# Patient Record
Sex: MALE | Race: OTHER | HISPANIC OR LATINO | URBAN - METROPOLITAN AREA
[De-identification: names, ages, dates, MRNs, and addresses within clinical notes are randomized per-mention and may not be internally consistent; named-entity substitution may affect disease eponyms.]

---

## 2017-06-01 ENCOUNTER — INPATIENT (INPATIENT)
Facility: HOSPITAL | Age: 52
LOS: 1 days | Discharge: HOME | DRG: 392 | End: 2017-06-03
Attending: SURGERY | Admitting: SURGERY
Payer: MEDICAID

## 2017-06-01 VITALS
RESPIRATION RATE: 17 BRPM | SYSTOLIC BLOOD PRESSURE: 136 MMHG | OXYGEN SATURATION: 98 % | TEMPERATURE: 98 F | DIASTOLIC BLOOD PRESSURE: 85 MMHG | HEART RATE: 95 BPM

## 2017-06-01 DIAGNOSIS — Z90.49 ACQUIRED ABSENCE OF OTHER SPECIFIED PARTS OF DIGESTIVE TRACT: Chronic | ICD-10-CM

## 2017-06-01 DIAGNOSIS — K57.40 DIVERTICULITIS OF BOTH SMALL AND LARGE INTESTINE WITH PERFORATION AND ABSCESS WITHOUT BLEEDING: ICD-10-CM

## 2017-06-01 LAB
ALBUMIN SERPL ELPH-MCNC: 4.2 G/DL — SIGNIFICANT CHANGE UP (ref 3.3–5)
ALP SERPL-CCNC: 107 U/L — SIGNIFICANT CHANGE UP (ref 40–120)
ALT FLD-CCNC: 19 U/L RC — SIGNIFICANT CHANGE UP (ref 10–45)
ANION GAP SERPL CALC-SCNC: 12 MMOL/L — SIGNIFICANT CHANGE UP (ref 5–17)
APPEARANCE UR: ABNORMAL
AST SERPL-CCNC: 20 U/L — SIGNIFICANT CHANGE UP (ref 10–40)
BACTERIA # UR AUTO: ABNORMAL /HPF
BASOPHILS # BLD AUTO: 0 K/UL — SIGNIFICANT CHANGE UP (ref 0–0.2)
BASOPHILS NFR BLD AUTO: 0.2 % — SIGNIFICANT CHANGE UP (ref 0–2)
BILIRUB SERPL-MCNC: 1 MG/DL — SIGNIFICANT CHANGE UP (ref 0.2–1.2)
BILIRUB UR-MCNC: NEGATIVE — SIGNIFICANT CHANGE UP
BUN SERPL-MCNC: 15 MG/DL — SIGNIFICANT CHANGE UP (ref 7–23)
CALCIUM SERPL-MCNC: 9.6 MG/DL — SIGNIFICANT CHANGE UP (ref 8.4–10.5)
CHLORIDE SERPL-SCNC: 103 MMOL/L — SIGNIFICANT CHANGE UP (ref 96–108)
CO2 SERPL-SCNC: 28 MMOL/L — SIGNIFICANT CHANGE UP (ref 22–31)
COLOR SPEC: YELLOW — SIGNIFICANT CHANGE UP
CREAT SERPL-MCNC: 0.97 MG/DL — SIGNIFICANT CHANGE UP (ref 0.5–1.3)
DIFF PNL FLD: NEGATIVE — SIGNIFICANT CHANGE UP
EOSINOPHIL # BLD AUTO: 0.1 K/UL — SIGNIFICANT CHANGE UP (ref 0–0.5)
EOSINOPHIL NFR BLD AUTO: 0.5 % — SIGNIFICANT CHANGE UP (ref 0–6)
EPI CELLS # UR: SIGNIFICANT CHANGE UP /HPF
GAS PNL BLDV: SIGNIFICANT CHANGE UP
GLUCOSE SERPL-MCNC: 104 MG/DL — HIGH (ref 70–99)
GLUCOSE UR QL: NEGATIVE — SIGNIFICANT CHANGE UP
HCT VFR BLD CALC: 47.4 % — SIGNIFICANT CHANGE UP (ref 39–50)
HGB BLD-MCNC: 15.5 G/DL — SIGNIFICANT CHANGE UP (ref 13–17)
KETONES UR-MCNC: NEGATIVE — SIGNIFICANT CHANGE UP
LEUKOCYTE ESTERASE UR-ACNC: ABNORMAL
LYMPHOCYTES # BLD AUTO: 1.7 K/UL — SIGNIFICANT CHANGE UP (ref 1–3.3)
LYMPHOCYTES # BLD AUTO: 9.7 % — LOW (ref 13–44)
MCHC RBC-ENTMCNC: 28.8 PG — SIGNIFICANT CHANGE UP (ref 27–34)
MCHC RBC-ENTMCNC: 32.8 GM/DL — SIGNIFICANT CHANGE UP (ref 32–36)
MCV RBC AUTO: 87.9 FL — SIGNIFICANT CHANGE UP (ref 80–100)
MONOCYTES # BLD AUTO: 1.6 K/UL — HIGH (ref 0–0.9)
MONOCYTES NFR BLD AUTO: 9 % — SIGNIFICANT CHANGE UP (ref 2–14)
NEUTROPHILS # BLD AUTO: 13.9 K/UL — HIGH (ref 1.8–7.4)
NEUTROPHILS NFR BLD AUTO: 80.6 % — HIGH (ref 43–77)
NITRITE UR-MCNC: NEGATIVE — SIGNIFICANT CHANGE UP
PH UR: 6.5 — SIGNIFICANT CHANGE UP (ref 5–8)
PLATELET # BLD AUTO: 210 K/UL — SIGNIFICANT CHANGE UP (ref 150–400)
POTASSIUM SERPL-MCNC: 4 MMOL/L — SIGNIFICANT CHANGE UP (ref 3.5–5.3)
POTASSIUM SERPL-SCNC: 4 MMOL/L — SIGNIFICANT CHANGE UP (ref 3.5–5.3)
PROT SERPL-MCNC: 7.3 G/DL — SIGNIFICANT CHANGE UP (ref 6–8.3)
PROT UR-MCNC: SIGNIFICANT CHANGE UP
RBC # BLD: 5.4 M/UL — SIGNIFICANT CHANGE UP (ref 4.2–5.8)
RBC # FLD: 12.6 % — SIGNIFICANT CHANGE UP (ref 10.3–14.5)
RBC CASTS # UR COMP ASSIST: SIGNIFICANT CHANGE UP /HPF (ref 0–2)
SODIUM SERPL-SCNC: 143 MMOL/L — SIGNIFICANT CHANGE UP (ref 135–145)
SP GR SPEC: 1.01 — SIGNIFICANT CHANGE UP (ref 1.01–1.02)
UROBILINOGEN FLD QL: NEGATIVE — SIGNIFICANT CHANGE UP
WBC # BLD: 17.7 K/UL — HIGH (ref 3.8–10.5)
WBC # FLD AUTO: 17.7 K/UL — HIGH (ref 3.8–10.5)
WBC UR QL: SIGNIFICANT CHANGE UP /HPF (ref 0–5)

## 2017-06-01 PROCEDURE — 99222 1ST HOSP IP/OBS MODERATE 55: CPT

## 2017-06-01 PROCEDURE — 99285 EMERGENCY DEPT VISIT HI MDM: CPT | Mod: 25

## 2017-06-01 PROCEDURE — 74177 CT ABD & PELVIS W/CONTRAST: CPT | Mod: 26

## 2017-06-01 RX ORDER — PIPERACILLIN AND TAZOBACTAM 4; .5 G/20ML; G/20ML
3.38 INJECTION, POWDER, LYOPHILIZED, FOR SOLUTION INTRAVENOUS EVERY 8 HOURS
Qty: 0 | Refills: 0 | Status: DISCONTINUED | OUTPATIENT
Start: 2017-06-01 | End: 2017-06-03

## 2017-06-01 RX ORDER — HYDROMORPHONE HYDROCHLORIDE 2 MG/ML
0.5 INJECTION INTRAMUSCULAR; INTRAVENOUS; SUBCUTANEOUS EVERY 4 HOURS
Qty: 0 | Refills: 0 | Status: DISCONTINUED | OUTPATIENT
Start: 2017-06-01 | End: 2017-06-03

## 2017-06-01 RX ORDER — ENOXAPARIN SODIUM 100 MG/ML
40 INJECTION SUBCUTANEOUS DAILY
Qty: 0 | Refills: 0 | Status: DISCONTINUED | OUTPATIENT
Start: 2017-06-01 | End: 2017-06-03

## 2017-06-01 RX ORDER — PIPERACILLIN AND TAZOBACTAM 4; .5 G/20ML; G/20ML
3.38 INJECTION, POWDER, LYOPHILIZED, FOR SOLUTION INTRAVENOUS ONCE
Qty: 0 | Refills: 0 | Status: COMPLETED | OUTPATIENT
Start: 2017-06-01 | End: 2017-06-01

## 2017-06-01 RX ORDER — DEXTROSE MONOHYDRATE, SODIUM CHLORIDE, AND POTASSIUM CHLORIDE 50; .745; 4.5 G/1000ML; G/1000ML; G/1000ML
1000 INJECTION, SOLUTION INTRAVENOUS
Qty: 0 | Refills: 0 | Status: DISCONTINUED | OUTPATIENT
Start: 2017-06-01 | End: 2017-06-03

## 2017-06-01 RX ORDER — SODIUM CHLORIDE 9 MG/ML
1000 INJECTION INTRAMUSCULAR; INTRAVENOUS; SUBCUTANEOUS ONCE
Qty: 0 | Refills: 0 | Status: COMPLETED | OUTPATIENT
Start: 2017-06-01 | End: 2017-06-01

## 2017-06-01 RX ORDER — KETOROLAC TROMETHAMINE 30 MG/ML
15 SYRINGE (ML) INJECTION ONCE
Qty: 0 | Refills: 0 | Status: DISCONTINUED | OUTPATIENT
Start: 2017-06-01 | End: 2017-06-01

## 2017-06-01 RX ORDER — SODIUM CHLORIDE 9 MG/ML
1000 INJECTION, SOLUTION INTRAVENOUS
Qty: 0 | Refills: 0 | Status: DISCONTINUED | OUTPATIENT
Start: 2017-06-01 | End: 2017-06-01

## 2017-06-01 RX ADMIN — Medication 15 MILLIGRAM(S): at 10:54

## 2017-06-01 RX ADMIN — Medication 15 MILLIGRAM(S): at 08:43

## 2017-06-01 RX ADMIN — SODIUM CHLORIDE 2000 MILLILITER(S): 9 INJECTION INTRAMUSCULAR; INTRAVENOUS; SUBCUTANEOUS at 08:43

## 2017-06-01 RX ADMIN — PIPERACILLIN AND TAZOBACTAM 25 GRAM(S): 4; .5 INJECTION, POWDER, LYOPHILIZED, FOR SOLUTION INTRAVENOUS at 21:32

## 2017-06-01 RX ADMIN — PIPERACILLIN AND TAZOBACTAM 25 GRAM(S): 4; .5 INJECTION, POWDER, LYOPHILIZED, FOR SOLUTION INTRAVENOUS at 21:31

## 2017-06-01 RX ADMIN — PIPERACILLIN AND TAZOBACTAM 200 GRAM(S): 4; .5 INJECTION, POWDER, LYOPHILIZED, FOR SOLUTION INTRAVENOUS at 10:54

## 2017-06-01 RX ADMIN — SODIUM CHLORIDE 125 MILLILITER(S): 9 INJECTION, SOLUTION INTRAVENOUS at 10:52

## 2017-06-01 RX ADMIN — DEXTROSE MONOHYDRATE, SODIUM CHLORIDE, AND POTASSIUM CHLORIDE 75 MILLILITER(S): 50; .745; 4.5 INJECTION, SOLUTION INTRAVENOUS at 21:36

## 2017-06-01 NOTE — ED PROVIDER NOTE - PHYSICAL EXAMINATION
Attending MD Benavides: A & O x 3, NAD, HEENT WNL and no facial asymmetry; lungs CTAB, heart with reg rhythm without murmur; abdomen soft, moderate distention, +focal ttp in LLQ with voluntary guarding; extremities with no edema; neuro exam non focal with no motor or sensory deficits.

## 2017-06-01 NOTE — ED PROVIDER NOTE - ATTENDING CONTRIBUTION TO CARE
Attending MD Benavides:  I personally have seen and examined this patient.  Resident note reviewed and agree on plan of care and except where noted.  See HPI, PE, and MDM for details.     51M with LLQ abd pain, exam with focal LLQ abd pain, likely diverticulitis, will obtain CT a/p to confirm and rule out complicated diverticulitis

## 2017-06-01 NOTE — ED PROVIDER NOTE - CARE PLAN
Principal Discharge DX:	Diverticulitis of both small and large intestine with perforation or abscess without bleeding

## 2017-06-01 NOTE — ED PROVIDER NOTE - MEDICAL DECISION MAKING DETAILS
51M with signs and symptoms concerning for diverticulitis. No prior colonoscopy. No prior pains. Will obtain ctap and blood to r/o complications. Yossi Tomlinson, Resident.

## 2017-06-01 NOTE — ED PROVIDER NOTE - OBJECTIVE STATEMENT
51M presents with LLQ pain that started suddenly yesterday, moderate, radiating to the RLQ and back, constant worse with movement/coughing and better with rest. No change in BM. Denies fevers/chills, nausea/vomiting, headache, chest pain, shortness of breath, bowel/bladder changes.

## 2017-06-01 NOTE — H&P ADULT. - ASSESSMENT
51M Diverticulitis with surrounding inflammation  - Admit to [facility], Dr. Reyes  - NPO/IVF  - IV Zosyn  - Serial abdominal exams  - OOB  - Lovenox DVT ppx  - Pain control PRN

## 2017-06-01 NOTE — ED ADULT NURSE REASSESSMENT NOTE - NS ED NURSE REASSESS COMMENT FT1
1106  Pt reassessed . Pt Discussed with pt his radiology results . Due meds given pain under control denies N/V/D  pt kept NPO awaiting for decision

## 2017-06-01 NOTE — H&P ADULT. - HISTORY OF PRESENT ILLNESS
51M presents with one day of abdominal pain and chills at home. Denies n/v, denies change in bowel habits. Has never had a colonoscopy. Has never had diverticulitis previously.   Patient's past medical/surgical history significant for cholecystectomy >10y ago. He does not take any medications. He is a current smoker.  On exam the patient was afebrile with stable vital signs. He appeared comfortable and in no acute distress. His abdomen was soft with minimal LLQ tenderness, no rebound or guarding.  Laboratory values showed leukocytosis to 17, venous lactate 1.2. BMP wnl.   CT scan revealed diverticulitis of distal descending and proximal sigmoid colon with inflammatory changes consistent with local perforation.

## 2017-06-01 NOTE — ED ADULT NURSE REASSESSMENT NOTE - NS ED NURSE REASSESS COMMENT FT1
0950 pt reassessed. Pt feels better with the pain after pain meds. Pian scale 4/10.  Pt waiting for CT scan.  Pt has no N/V/D.

## 2017-06-01 NOTE — ED ADULT NURSE NOTE - CHPI ED SYMPTOMS NEG
no burning urination/no dysuria/no nausea/no hematuria/no vomiting/no fever/no blood in stool/no diarrhea/no abdominal distension

## 2017-06-01 NOTE — ED ADULT NURSE NOTE - OBJECTIVE STATEMENT
Pt is C/O LLQ pain x 1 day  pt woke up with the pain . pain  more on LLQ Pt denies  trauma heavy lifting  or injury. Pt states pain radiating to the back , Pt denies N/V/D constipation CP/SOB  last BM  today am constipated & painful  at strain . Cindy changes in urine strain colour or odor  C/O chills yesterday afebrile here  pt is elaine getting evaluated by Ed team

## 2017-06-02 LAB
ANION GAP SERPL CALC-SCNC: 11 MMOL/L — SIGNIFICANT CHANGE UP (ref 5–17)
BUN SERPL-MCNC: 10 MG/DL — SIGNIFICANT CHANGE UP (ref 7–23)
CALCIUM SERPL-MCNC: 8.4 MG/DL — SIGNIFICANT CHANGE UP (ref 8.4–10.5)
CHLORIDE SERPL-SCNC: 105 MMOL/L — SIGNIFICANT CHANGE UP (ref 96–108)
CO2 SERPL-SCNC: 25 MMOL/L — SIGNIFICANT CHANGE UP (ref 22–31)
CREAT SERPL-MCNC: 1.02 MG/DL — SIGNIFICANT CHANGE UP (ref 0.5–1.3)
GLUCOSE SERPL-MCNC: 95 MG/DL — SIGNIFICANT CHANGE UP (ref 70–99)
HCT VFR BLD CALC: 41.7 % — SIGNIFICANT CHANGE UP (ref 39–50)
HGB BLD-MCNC: 13.9 G/DL — SIGNIFICANT CHANGE UP (ref 13–17)
MAGNESIUM SERPL-MCNC: 2.3 MG/DL — SIGNIFICANT CHANGE UP (ref 1.6–2.6)
MCHC RBC-ENTMCNC: 28.5 PG — SIGNIFICANT CHANGE UP (ref 27–34)
MCHC RBC-ENTMCNC: 33.3 GM/DL — SIGNIFICANT CHANGE UP (ref 32–36)
MCV RBC AUTO: 85.5 FL — SIGNIFICANT CHANGE UP (ref 80–100)
PHOSPHATE SERPL-MCNC: 3 MG/DL — SIGNIFICANT CHANGE UP (ref 2.5–4.5)
PLATELET # BLD AUTO: 222 K/UL — SIGNIFICANT CHANGE UP (ref 150–400)
POTASSIUM SERPL-MCNC: 4.5 MMOL/L — SIGNIFICANT CHANGE UP (ref 3.5–5.3)
POTASSIUM SERPL-SCNC: 4.5 MMOL/L — SIGNIFICANT CHANGE UP (ref 3.5–5.3)
RBC # BLD: 4.88 M/UL — SIGNIFICANT CHANGE UP (ref 4.2–5.8)
RBC # FLD: 14.2 % — SIGNIFICANT CHANGE UP (ref 10.3–14.5)
SODIUM SERPL-SCNC: 141 MMOL/L — SIGNIFICANT CHANGE UP (ref 135–145)
WBC # BLD: 13.45 K/UL — HIGH (ref 3.8–10.5)
WBC # FLD AUTO: 13.45 K/UL — HIGH (ref 3.8–10.5)

## 2017-06-02 PROCEDURE — 99231 SBSQ HOSP IP/OBS SF/LOW 25: CPT

## 2017-06-02 RX ADMIN — PIPERACILLIN AND TAZOBACTAM 25 GRAM(S): 4; .5 INJECTION, POWDER, LYOPHILIZED, FOR SOLUTION INTRAVENOUS at 13:09

## 2017-06-02 RX ADMIN — DEXTROSE MONOHYDRATE, SODIUM CHLORIDE, AND POTASSIUM CHLORIDE 75 MILLILITER(S): 50; .745; 4.5 INJECTION, SOLUTION INTRAVENOUS at 06:30

## 2017-06-02 RX ADMIN — PIPERACILLIN AND TAZOBACTAM 25 GRAM(S): 4; .5 INJECTION, POWDER, LYOPHILIZED, FOR SOLUTION INTRAVENOUS at 22:43

## 2017-06-02 RX ADMIN — PIPERACILLIN AND TAZOBACTAM 25 GRAM(S): 4; .5 INJECTION, POWDER, LYOPHILIZED, FOR SOLUTION INTRAVENOUS at 06:29

## 2017-06-02 RX ADMIN — ENOXAPARIN SODIUM 40 MILLIGRAM(S): 100 INJECTION SUBCUTANEOUS at 13:10

## 2017-06-03 VITALS
SYSTOLIC BLOOD PRESSURE: 120 MMHG | DIASTOLIC BLOOD PRESSURE: 77 MMHG | OXYGEN SATURATION: 96 % | RESPIRATION RATE: 18 BRPM | HEART RATE: 70 BPM | TEMPERATURE: 98 F

## 2017-06-03 LAB
ANION GAP SERPL CALC-SCNC: 11 MMOL/L — SIGNIFICANT CHANGE UP (ref 5–17)
BUN SERPL-MCNC: 5 MG/DL — LOW (ref 7–23)
CALCIUM SERPL-MCNC: 9 MG/DL — SIGNIFICANT CHANGE UP (ref 8.4–10.5)
CHLORIDE SERPL-SCNC: 102 MMOL/L — SIGNIFICANT CHANGE UP (ref 96–108)
CO2 SERPL-SCNC: 24 MMOL/L — SIGNIFICANT CHANGE UP (ref 22–31)
CREAT SERPL-MCNC: 1.03 MG/DL — SIGNIFICANT CHANGE UP (ref 0.5–1.3)
GLUCOSE SERPL-MCNC: 105 MG/DL — HIGH (ref 70–99)
HCT VFR BLD CALC: 42.6 % — SIGNIFICANT CHANGE UP (ref 39–50)
HGB BLD-MCNC: 14.1 G/DL — SIGNIFICANT CHANGE UP (ref 13–17)
MAGNESIUM SERPL-MCNC: 2.3 MG/DL — SIGNIFICANT CHANGE UP (ref 1.6–2.6)
MCHC RBC-ENTMCNC: 28.4 PG — SIGNIFICANT CHANGE UP (ref 27–34)
MCHC RBC-ENTMCNC: 33.1 GM/DL — SIGNIFICANT CHANGE UP (ref 32–36)
MCV RBC AUTO: 85.9 FL — SIGNIFICANT CHANGE UP (ref 80–100)
PHOSPHATE SERPL-MCNC: 3.6 MG/DL — SIGNIFICANT CHANGE UP (ref 2.5–4.5)
PLATELET # BLD AUTO: 233 K/UL — SIGNIFICANT CHANGE UP (ref 150–400)
POTASSIUM SERPL-MCNC: 4.2 MMOL/L — SIGNIFICANT CHANGE UP (ref 3.5–5.3)
POTASSIUM SERPL-SCNC: 4.2 MMOL/L — SIGNIFICANT CHANGE UP (ref 3.5–5.3)
RBC # BLD: 4.96 M/UL — SIGNIFICANT CHANGE UP (ref 4.2–5.8)
RBC # FLD: 14.2 % — SIGNIFICANT CHANGE UP (ref 10.3–14.5)
SODIUM SERPL-SCNC: 137 MMOL/L — SIGNIFICANT CHANGE UP (ref 135–145)
WBC # BLD: 10.08 K/UL — SIGNIFICANT CHANGE UP (ref 3.8–10.5)
WBC # FLD AUTO: 10.08 K/UL — SIGNIFICANT CHANGE UP (ref 3.8–10.5)

## 2017-06-03 PROCEDURE — 80048 BASIC METABOLIC PNL TOTAL CA: CPT

## 2017-06-03 PROCEDURE — 82435 ASSAY OF BLOOD CHLORIDE: CPT

## 2017-06-03 PROCEDURE — 84132 ASSAY OF SERUM POTASSIUM: CPT

## 2017-06-03 PROCEDURE — 83605 ASSAY OF LACTIC ACID: CPT

## 2017-06-03 PROCEDURE — 82803 BLOOD GASES ANY COMBINATION: CPT

## 2017-06-03 PROCEDURE — 84100 ASSAY OF PHOSPHORUS: CPT

## 2017-06-03 PROCEDURE — 82330 ASSAY OF CALCIUM: CPT

## 2017-06-03 PROCEDURE — 84295 ASSAY OF SERUM SODIUM: CPT

## 2017-06-03 PROCEDURE — 96374 THER/PROPH/DIAG INJ IV PUSH: CPT | Mod: XU

## 2017-06-03 PROCEDURE — 96375 TX/PRO/DX INJ NEW DRUG ADDON: CPT

## 2017-06-03 PROCEDURE — 99231 SBSQ HOSP IP/OBS SF/LOW 25: CPT

## 2017-06-03 PROCEDURE — 80053 COMPREHEN METABOLIC PANEL: CPT

## 2017-06-03 PROCEDURE — 83735 ASSAY OF MAGNESIUM: CPT

## 2017-06-03 PROCEDURE — 99285 EMERGENCY DEPT VISIT HI MDM: CPT | Mod: 25

## 2017-06-03 PROCEDURE — 82947 ASSAY GLUCOSE BLOOD QUANT: CPT

## 2017-06-03 PROCEDURE — 74177 CT ABD & PELVIS W/CONTRAST: CPT

## 2017-06-03 PROCEDURE — 85027 COMPLETE CBC AUTOMATED: CPT

## 2017-06-03 PROCEDURE — 81001 URINALYSIS AUTO W/SCOPE: CPT

## 2017-06-03 PROCEDURE — 85014 HEMATOCRIT: CPT

## 2017-06-03 RX ADMIN — PIPERACILLIN AND TAZOBACTAM 25 GRAM(S): 4; .5 INJECTION, POWDER, LYOPHILIZED, FOR SOLUTION INTRAVENOUS at 13:55

## 2017-06-03 RX ADMIN — ENOXAPARIN SODIUM 40 MILLIGRAM(S): 100 INJECTION SUBCUTANEOUS at 13:55

## 2017-06-03 RX ADMIN — PIPERACILLIN AND TAZOBACTAM 25 GRAM(S): 4; .5 INJECTION, POWDER, LYOPHILIZED, FOR SOLUTION INTRAVENOUS at 06:02

## 2017-06-03 NOTE — DISCHARGE NOTE ADULT - CARE PROVIDER_API CALL
Pérez Clay), Surgery  [address] [place] [id number]  Phone: [phone]  Fax: [phone]

## 2017-06-03 NOTE — DIETITIAN INITIAL EVALUATION ADULT. - ENERGY NEEDS
ht: 68 inches wt: 195.1 pounds (current as of 6/1). BMI: 29.7 kG/m2. UBW: 195 pounds. IBW: 154 pounds +/- 10%. %IBW: 127%  Other pertinent objective information: 51 year old male pt c no significant past medical hx presented c abd pain and chills, CT scan revealed diverticulitis of distal descending and proximal sigmoid colon c inflammatory changes consistent with local perforation. On antibiotics; no surgical treatment at this time per MD. No edema, no pressure injuries.

## 2017-06-03 NOTE — DIETITIAN INITIAL EVALUATION ADULT. - NS AS NUTRI INTERV ED CONTENT
1) Educated pt on low residue diet recommendations, including foods to limit/include in diet; discussed gradual inclusion of fiber into the diet as tolerated/as symptoms resolve to promote bowel integrity. Low-fiber nutrition therapy handout provided to pt for review at his leisure./Nutrition relationship to health/disease/Purpose of the nutrition education/Recommended modifications

## 2017-06-03 NOTE — DISCHARGE NOTE ADULT - PATIENT PORTAL LINK FT
“You can access the FollowHealth Patient Portal, offered by [facility], by registering with the following website: [email or web address]”

## 2017-06-03 NOTE — DISCHARGE NOTE ADULT - HOSPITAL COURSE
51M presents with one day of abdominal pain and chills at home. Denies n/v, denies change in bowel habits. Has never had a colonoscopy. Has never had diverticulitis previously.   Patient's past medical/surgical history significant for cholecystectomy >10y ago. He does not take any medications. He is a current smoker.  On exam the patient was afebrile with stable vital signs. He appeared comfortable and in no acute distress. His abdomen was soft with minimal LLQ tenderness, no rebound or guarding.  Laboratory values showed leukocytosis to 17, venous lactate 1.2. BMP wnl.   CT scan revealed diverticulitis of distal descending and proximal sigmoid colon with inflammatory changes consistent with local perforation.  Pt was made NPO and iv antibiotics were started. Pts abdominal pain improved, started on clear liquid diet. Pt tolerated a diet, no abdominal tenderness, advanced to low residue diet. Pt to follow up with Dr. Clay regarding his diverticulitis and colonoscopy.

## 2017-06-03 NOTE — DIETITIAN INITIAL EVALUATION ADULT. - OTHER INFO
Pt seen for diverticulitis education. Pt reports feeling very well today, denies N/V/diarrhea, denies abd. pain. Pt had 2 bowel movements yesterday, (+) flatus today. Pt was upgraded to low fiber diet today, previously on clear liquids; pt states he ate all of meal and tolerated low fiber diet very well. Pt denies food allergies, denies dificulty chewing/swallowing, denies recent wt changes. UBW of 195 reported; consistent c recorded wt 6/1 195 pounds. Pt interested in diet recommendations for d/c, unable to teach-back foods that contain fiber prior to education.

## 2017-06-03 NOTE — DIETITIAN INITIAL EVALUATION ADULT. - NS AS NUTRI INTERV MEALS SNACK
1) Continue low residue diet, gradual introduction of fiber back into the diet as pt tolerates/as symptoms resolve/Fiber - modified diet

## 2017-06-03 NOTE — DISCHARGE NOTE ADULT - CARE PLAN
Principal Discharge DX:	Diverticulitis of both small and large intestine with perforation or abscess without bleeding  Goal:	pain control  Instructions for follow-up, activity and diet:	Follow up with Dr. Clay (colorectal surgeon) regarding your episode of diverticulitis and colonoscopy in 4-6 weeks. Diet- low residue diet, Shower- yes you may shower, Activity- as tolerated.   Notify your dr if you experience any nausea/ vomiting, fever, chills, abdominal pain, chest pain,

## 2017-06-03 NOTE — DIETITIAN INITIAL EVALUATION ADULT. - ORAL INTAKE PTA
Pt reports good po intake PTA; typical intake includes plain bagel with eggs and coffee in the morning, and fast food (pizza, take-out meals) for lunch and dinner. Pt takes [employer] PTA./good

## 2017-06-03 NOTE — DISCHARGE NOTE ADULT - NS AS ACTIVITY OBS
Walking-Outdoors allowed/Walking-Indoors allowed/Do not make important decisions/Showering allowed/Bathing allowed/Do not drive or operate machinery/Stairs allowed

## 2017-06-03 NOTE — DISCHARGE NOTE ADULT - PLAN OF CARE
pain control Follow up with Dr. Clay (colorectal surgeon) regarding your episode of diverticulitis and colonoscopy in 4-6 weeks. Diet- low residue diet, Shower- yes you may shower, Activity- as tolerated.   Notify your dr if you experience any nausea/ vomiting, fever, chills, abdominal pain, chest pain,

## 2017-06-03 NOTE — DISCHARGE NOTE ADULT - MEDICATION SUMMARY - MEDICATIONS TO TAKE
I will START or STAY ON the medications listed below when I get home from the hospital:    Augmentin 875 mg-125 mg oral tablet  -- 1 tab(s) by mouth every 12 hours MDD:2  -- Indication: For Diverticulitis of both small and large intestine with perforation or abscess without bleeding

## 2018-02-27 NOTE — ED ADULT NURSE NOTE - NO SIGNIFICANT PAST SURGICAL HISTORY
You can access the [facility] Patient Portal, offered by [facility], by registering with the following website: [email or web address] <<----- Click to add NO significant Past Surgical History

## 2019-01-31 NOTE — ED ADULT TRIAGE NOTE - BP NONINVASIVE DIASTOLIC (MM HG)
85
Patient Reported Weight (Optional - Include Units): 210
Detail Level: Zone
Ipledge Number (Optional): [id number]

## 2019-11-15 NOTE — DISCHARGE NOTE ADULT - NS AS DC PROVIDER CONTACT Y/N MULTI
Nutrition Services Brief Progress Note - please see note from 11/12 for full assessment    Diet: Mechanical/Dental Soft Diet - patient now taking oral intake of food/drink  EN regimen:  Nutren 1.5 @ 60 mL/hr to provide 2160 kcals (26 kcal/kg/day), 98 g PRO (1.2 g/kg/day), 1094 mL H2O, 253 g CHO and no fiber daily. + 2 pkts Prosource   - 100 ml H2O flush every hour    Labs (11/15): phos 1.5 mg/dL (L)  Meds: phosphorus tablet    Interventions  1. Discussed FEN/GI with team. Team okayed this writer to change H2O flushes to 30 ml q 4 hours   2. Ordered calorie counts    Unit RD will continue to monitor per protocol  Danielle Beavers, MS/RD/LD/CNSC  [address] RD Pager: [phone]           Yes

## 2019-12-26 ENCOUNTER — EMERGENCY (EMERGENCY)
Facility: HOSPITAL | Age: 54
LOS: 1 days | Discharge: HOME | End: 2019-12-26
Attending: EMERGENCY MEDICINE
Payer: MEDICAID

## 2019-12-26 VITALS
RESPIRATION RATE: 17 BRPM | TEMPERATURE: 99 F | OXYGEN SATURATION: 97 % | DIASTOLIC BLOOD PRESSURE: 78 MMHG | SYSTOLIC BLOOD PRESSURE: 130 MMHG | HEART RATE: 90 BPM

## 2019-12-26 VITALS
RESPIRATION RATE: 18 BRPM | HEART RATE: 101 BPM | SYSTOLIC BLOOD PRESSURE: 135 MMHG | DIASTOLIC BLOOD PRESSURE: 84 MMHG | OXYGEN SATURATION: 97 % | TEMPERATURE: 99 F | WEIGHT: 190.04 LBS | HEIGHT: 67 IN

## 2019-12-26 DIAGNOSIS — Z90.49 ACQUIRED ABSENCE OF OTHER SPECIFIED PARTS OF DIGESTIVE TRACT: Chronic | ICD-10-CM

## 2019-12-26 PROCEDURE — 99284 EMERGENCY DEPT VISIT MOD MDM: CPT

## 2019-12-26 PROCEDURE — 73030 X-RAY EXAM OF SHOULDER: CPT

## 2019-12-26 PROCEDURE — 99283 EMERGENCY DEPT VISIT LOW MDM: CPT

## 2019-12-26 PROCEDURE — 73060 X-RAY EXAM OF HUMERUS: CPT

## 2019-12-26 RX ORDER — IBUPROFEN 200 MG
600 TABLET ORAL ONCE
Refills: 0 | Status: COMPLETED | OUTPATIENT
Start: 2019-12-26 | End: 2019-12-26

## 2019-12-26 RX ORDER — ACETAMINOPHEN 500 MG
975 TABLET ORAL ONCE
Refills: 0 | Status: COMPLETED | OUTPATIENT
Start: 2019-12-26 | End: 2019-12-26

## 2019-12-26 RX ORDER — OXYCODONE HYDROCHLORIDE 5 MG/1
5 TABLET ORAL ONCE
Refills: 0 | Status: DISCONTINUED | OUTPATIENT
Start: 2019-12-26 | End: 2019-12-26

## 2019-12-26 RX ADMIN — Medication 600 MILLIGRAM(S): at 22:32

## 2019-12-26 RX ADMIN — OXYCODONE HYDROCHLORIDE 5 MILLIGRAM(S): 5 TABLET ORAL at 22:32

## 2019-12-26 RX ADMIN — Medication 975 MILLIGRAM(S): at 22:31

## 2019-12-26 RX ADMIN — OXYCODONE HYDROCHLORIDE 5 MILLIGRAM(S): 5 TABLET ORAL at 22:31

## 2019-12-26 RX ADMIN — Medication 975 MILLIGRAM(S): at 22:32

## 2019-12-26 NOTE — ED ADULT NURSE NOTE - OBJECTIVE STATEMENT
54 year old male pt presented to the ED stating right arm/shoulder x 2 days, pt states he woke up with the pain denies any trauma, no obvious signs of deformity, no redness to site, +pp, states this happened 2 years ago and resolved on its own 3 or 4

## 2019-12-27 PROCEDURE — 73060 X-RAY EXAM OF HUMERUS: CPT | Mod: 26,RT

## 2019-12-27 PROCEDURE — 73030 X-RAY EXAM OF SHOULDER: CPT | Mod: 26,RT

## 2019-12-27 RX ORDER — OXYCODONE HYDROCHLORIDE 5 MG/1
1 TABLET ORAL
Qty: 8 | Refills: 0
Start: 2019-12-27 | End: 2019-12-28

## 2019-12-27 NOTE — ED PROVIDER NOTE - CLINICAL SUMMARY MEDICAL DECISION MAKING FREE TEXT BOX
Kasie: pain and decreased rom to rt shoulder. no trauma or strain. no redness or warmth, no obvious deformity. +++pain on motion. will xary. treat pain. if neg sling and fu with ortho.

## 2019-12-27 NOTE — ED PROVIDER NOTE - NSFOLLOWUPCLINICS_GEN_ALL_ED_FT
[facility]  Sports Medicine  [address] [place] [address]  Phone: [phone]  Fax:   Follow Up Time: 4-6 Days

## 2019-12-27 NOTE — ED PROCEDURE NOTE - CPROC ED POST PROC CARE GUIDE1
Instructed patient/caregiver to follow-up with primary care physician./Keep the cast/splint/dressing clean and dry./Instructed patient/caregiver regarding signs and symptoms of infection./Elevate the injured extremity as instructed./Verbal/written post procedure instructions were given to patient/caregiver.

## 2019-12-27 NOTE — ED PROVIDER NOTE - PATIENT PORTAL LINK FT
You can access the FollowMyHealth Patient Portal offered by [facility] by registering at the following website: [email or web address]. By joining [facility]’s FollowMyHealth portal, you will also be able to view your health information using other applications (apps) compatible with our system.

## 2019-12-27 NOTE — ED PROVIDER NOTE - ATTENDING CONTRIBUTION TO CARE
I performed a history and physical exam of the patient and discussed their management with the resident and /or advanced care provider. I reviewed the resident and /or ACP's note and agree with the documented findings and plan of care. My medical decison making and observations are found above.  Lungs clear, abd soft. rt shoulder nl N/V exam no warmth or swelling

## 2019-12-27 NOTE — ED PROVIDER NOTE - PENDING LAB RAD OPT OUT
Call from Fabián. Has questions related to process if he has a back injection in the future. Advised patient that if he does schedule a back injection (or any procedure) that he should contact the clinic ([facility] #[id number]) as soon as possible with the procedural MD, the procedure, the date and the number of days Warfarin will be held. Informed patient of the process (clinic will follow up with procedural MD and referring MD to determine Warfarin hold and if bridging will be necessary). Patient verbalized understanding. Has clinic number.   Exclude Pending Lab and Radiology orders from printing on the Patient's Discharge Instructions, due to Privacy Concerns.

## 2019-12-27 NOTE — ED PROVIDER NOTE - CROS ED CARDIOVAS ALL NEG
Mayco Cyr), Internal Medicine  [address] [place] [address]  Phone: [phone]  Fax: [phone] negative...

## 2019-12-27 NOTE — ED PROVIDER NOTE - OBJECTIVE STATEMENT
55 yo otherwise healthy male presents to the ED c/o atraumatic R arm and shoulder pain x 3 days. Pt employed as  and noticed he woke up with the pain one day, felt soreness in R upper arm and though he may have slept on the arm wrong. Pain has worsened since onset, today pt states he could barely move arm at the shoulder 2/2 pain which prompted visit. Has been taking tylenol without relief. Denies numbness/tingling, weakness fall, trauma, chest pain, shortness of breath, rash.

## 2019-12-27 NOTE — ED PROVIDER NOTE - NSFOLLOWUPINSTRUCTIONS_ED_ALL_ED_FT
1. Follow up with your PMD in 1-2 days. Additionally if symptoms persist recommend follow up with an orthopedist in the next 2-3 days for further evaluation/management.   2. Rest, stay hydrated.   3. Keep arm in splint for comfort. It is important you take the arm out of the sling periodically throughout the day and perform gentle range of motion exercises to prevent stiff shoulder.   4. Take Tylenol 650 mg every 6 hours as needed for pain. Take Motrin 600 mg every 8hrs with food for pain. Take Oxycodone 5mg every 6 hours as needed for any severe break-thru pain. CAUTION: Do not drive or consume alcohol while taking this medication.   5. Return to the ED immediately if you develop any new/worsening symptoms including weakness, numbness/tingling, fever/chills, increased pain or any other concerning symptoms.

## 2019-12-27 NOTE — ED PROVIDER NOTE - UPPER EXTREMITY EXAM, RIGHT
No obvious deformity, bruising or swelling of shoulder or RUE. +ttp R anterior shoulder and lateral deltoid with significant decreased ROM at shoulder in all directions 2/2 reported pain in shoulder.  strength intact. No bony elbow ttp. Supination/pronation intact. Compartments soft/compressible. 2+ radial pulses b/l. Sensation intact.

## 2021-07-05 NOTE — ED PROVIDER NOTE - NEURO NEGATIVE STATEMENT, MLM
Effexor  1 tab/day  Need new sig since changed to Immediate Release.  Will pt swallow tablets or crush for best absorption.  Advise.    Neurontin and Glucophage orders as b/4 but in liquid form.  Please review.  KPavelRN            no loss of consciousness, no gait abnormality, no headache, no sensory deficits, and no weakness.

## 2022-01-11 NOTE — DISCHARGE NOTE ADULT - NSCORESITESY/N_GEN_A_CORE_RD
Please call pt/spouse back regarding message below.  Spouse requests that staff to leave a detailed VM if no answer.     Yvette Gill RN  [facility] Nurse Advisor     No

## 2024-08-13 NOTE — ED PROVIDER NOTE - CROS ED GI ALL NEG
[None] : none [Cooperative] : cooperative [Euthymic] : euthymic [Full] : full [Clear] : clear [Linear/Goal Directed] : linear/goal directed [Average] : average [WNL] : within normal limits negative...

## 2024-12-05 NOTE — ED ADULT TRIAGE NOTE - HEART RATE (BEATS/MIN)
2020- MOB and Infant received to room 333 from L&D in MOB's arms, placed into open crib, ID bands checked x2, cuddles tag in place and blinking. Bedside report received from L&D RN Ivy. Transition assessments in progress. Parents oriented to room, unit, plan of care, call light, feeding schedule, diapering, and infant safety and security, questions answered and parents verbalize understanding of instructions.    2030-  Assessment completed. Fundus firm, lochia scant. POC reviewed with MOB. Verbalized understanding. No further questions at this time. Patient educated on emergency call light. Call light within reach.     2145- MOB educated on breastfeeding infant Q3 hours and PRN feeding cues. MOB assisted with hand expression and breast feeding at this time. Allow for questions and answers.    95